# Patient Record
Sex: FEMALE | HISPANIC OR LATINO | URBAN - METROPOLITAN AREA
[De-identification: names, ages, dates, MRNs, and addresses within clinical notes are randomized per-mention and may not be internally consistent; named-entity substitution may affect disease eponyms.]

---

## 2020-09-01 ENCOUNTER — OFFICE VISIT (OUTPATIENT)
Dept: URBAN - METROPOLITAN AREA CLINIC 98 | Facility: CLINIC | Age: 45
End: 2020-09-01
Payer: COMMERCIAL

## 2020-09-01 VITALS
TEMPERATURE: 98.3 F | HEIGHT: 60 IN | HEART RATE: 76 BPM | SYSTOLIC BLOOD PRESSURE: 140 MMHG | WEIGHT: 264 LBS | BODY MASS INDEX: 51.83 KG/M2 | DIASTOLIC BLOOD PRESSURE: 86 MMHG

## 2020-09-01 DIAGNOSIS — R10.84 GENERALIZED ABDOMINAL PAIN: ICD-10-CM

## 2020-09-01 DIAGNOSIS — D50.0 IRON DEFICIENCY ANEMIA DUE TO CHRONIC BLOOD LOSS: ICD-10-CM

## 2020-09-01 DIAGNOSIS — K21.0 REFLUX ESOPHAGITIS: ICD-10-CM

## 2020-09-01 PROCEDURE — G8417 CALC BMI ABV UP PARAM F/U: HCPCS | Performed by: INTERNAL MEDICINE

## 2020-09-01 PROCEDURE — 99204 OFFICE O/P NEW MOD 45 MIN: CPT | Performed by: INTERNAL MEDICINE

## 2020-09-01 PROCEDURE — 1036F TOBACCO NON-USER: CPT | Performed by: INTERNAL MEDICINE

## 2020-09-01 RX ORDER — POLYETHYLENE GLYCOL 3350, SODIUM CHLORIDE, SODIUM BICARBONATE AND POTASSIUM CHLORIDE 420G
AS DIRECTED KIT ORAL ONCE
Qty: 420 GRAM | Refills: 0 | OUTPATIENT
Start: 2020-09-01

## 2020-09-01 RX ORDER — PANTOPRAZOLE SODIUM 40 MG/1
1 TABLET TABLET, DELAYED RELEASE ORAL BID
Qty: 180 TABLET | Refills: 4 | OUTPATIENT
Start: 2020-09-01

## 2020-09-01 NOTE — HPI-TODAY'S VISIT:
Issues with gastritis and bloating for the last year.  Worse over the last couple of months.  Does get reflux as well. Does get regurgitation as well.  Using PPI once a day 40 mg. Been on it for a year- some help.  No CIBH or bleeding.  hgb 9.7 and MCV 70 History of breast cancer- May 2020- low ferritin. Elevated AST And ALT 65, 141- now 31 and 29 US with fatty liver.

## 2020-09-09 ENCOUNTER — OFFICE VISIT (OUTPATIENT)
Dept: URBAN - METROPOLITAN AREA MEDICAL CENTER 28 | Facility: MEDICAL CENTER | Age: 45
End: 2020-09-09
Payer: COMMERCIAL

## 2020-09-09 DIAGNOSIS — D50.9 ANEMIA, IRON DEFICIENCY: ICD-10-CM

## 2020-09-09 DIAGNOSIS — K31.89 ACQUIRED DEFORMITY OF DUODENUM: ICD-10-CM

## 2020-09-09 DIAGNOSIS — K20.8 CORROSIVE ESOPHAGITIS: ICD-10-CM

## 2020-09-09 PROCEDURE — G9937 DIG OR SURV COLSCO: HCPCS | Performed by: INTERNAL MEDICINE

## 2020-09-09 PROCEDURE — 43239 EGD BIOPSY SINGLE/MULTIPLE: CPT | Performed by: INTERNAL MEDICINE

## 2020-09-09 PROCEDURE — 45378 DIAGNOSTIC COLONOSCOPY: CPT | Performed by: INTERNAL MEDICINE

## 2020-09-09 RX ORDER — PANTOPRAZOLE SODIUM 40 MG/1
1 TABLET TABLET, DELAYED RELEASE ORAL BID
Qty: 180 TABLET | Refills: 4 | Status: ACTIVE | COMMUNITY
Start: 2020-09-01

## 2020-09-09 RX ORDER — POLYETHYLENE GLYCOL 3350, SODIUM CHLORIDE, SODIUM BICARBONATE AND POTASSIUM CHLORIDE 420G
AS DIRECTED KIT ORAL ONCE
Qty: 420 GRAM | Refills: 0 | Status: ACTIVE | COMMUNITY
Start: 2020-09-01

## 2020-09-24 ENCOUNTER — TELEPHONE ENCOUNTER (OUTPATIENT)
Dept: URBAN - METROPOLITAN AREA CLINIC 92 | Facility: CLINIC | Age: 45
End: 2020-09-24

## 2020-09-24 ENCOUNTER — OFFICE VISIT (OUTPATIENT)
Dept: URBAN - METROPOLITAN AREA TELEHEALTH 2 | Facility: TELEHEALTH | Age: 45
End: 2020-09-24
Payer: COMMERCIAL

## 2020-09-24 DIAGNOSIS — K21.0 REFLUX ESOPHAGITIS: ICD-10-CM

## 2020-09-24 DIAGNOSIS — R10.84 GENERALIZED ABDOMINAL PAIN: ICD-10-CM

## 2020-09-24 DIAGNOSIS — D50.8 OTHER IRON DEFICIENCY ANEMIAS: ICD-10-CM

## 2020-09-24 PROCEDURE — 1036F TOBACCO NON-USER: CPT | Performed by: INTERNAL MEDICINE

## 2020-09-24 PROCEDURE — G9903 PT SCRN TBCO ID AS NON USER: HCPCS | Performed by: INTERNAL MEDICINE

## 2020-09-24 PROCEDURE — G8417 CALC BMI ABV UP PARAM F/U: HCPCS | Performed by: INTERNAL MEDICINE

## 2020-09-24 PROCEDURE — 99213 OFFICE O/P EST LOW 20 MIN: CPT | Performed by: INTERNAL MEDICINE

## 2020-09-24 RX ORDER — PANTOPRAZOLE SODIUM 40 MG/1
1 TABLET TABLET, DELAYED RELEASE ORAL BID
Qty: 180 TABLET | Refills: 4 | Status: ACTIVE | COMMUNITY
Start: 2020-09-01

## 2020-09-24 RX ORDER — POLYETHYLENE GLYCOL 3350, SODIUM CHLORIDE, SODIUM BICARBONATE AND POTASSIUM CHLORIDE 420G
AS DIRECTED KIT ORAL ONCE
Qty: 420 GRAM | Refills: 0 | Status: ACTIVE | COMMUNITY
Start: 2020-09-01

## 2020-09-24 RX ORDER — AMITRIPTYLINE HYDROCHLORIDE 10 MG/1
TAKE 1 TABLET (10 MG) BY ORAL ROUTE ONCE DAILY AT BEDTIME FOR 30 DAYS TABLET, FILM COATED ORAL 1
Qty: 90 | Refills: 3 | OUTPATIENT
Start: 2020-09-24

## 2020-09-24 RX ORDER — HYDROCORTISONE 25 MG/G
1 APPLICATION CREAM TOPICAL ONCE A DAY
Qty: 1 TUBE | Refills: 1 | OUTPATIENT

## 2020-09-24 NOTE — HPI-TODAY'S VISIT:
Issues with gastritis and bloating for the last year.  Worse over the last couple of months.  Does get reflux as well. Does get regurgitation as well.  Using PPI once a day 40 mg. Been on it for a year- some help.  No CIBH or bleeding.  hgb 9.7 and MCV 70 History of breast cancer- May 2020- low ferritin. Elevated AST And ALT 65, 141- now 31 and 29 US with fatty liver.   Present CT with fatty liver. No other issues noted.  EGD and colon was normal.  LFT improved.  Still has epigastric discomfort.

## 2020-10-16 ENCOUNTER — OFFICE VISIT (OUTPATIENT)
Dept: URBAN - METROPOLITAN AREA CLINIC 98 | Facility: CLINIC | Age: 45
End: 2020-10-16

## 2021-02-23 ENCOUNTER — TELEPHONE ENCOUNTER (OUTPATIENT)
Dept: URBAN - METROPOLITAN AREA CLINIC 98 | Facility: CLINIC | Age: 46
End: 2021-02-23

## 2021-03-03 ENCOUNTER — TELEPHONE ENCOUNTER (OUTPATIENT)
Dept: URBAN - METROPOLITAN AREA CLINIC 98 | Facility: CLINIC | Age: 46
End: 2021-03-03

## 2021-03-18 PROBLEM — 724556004: Status: ACTIVE | Noted: 2021-02-23

## 2021-04-06 ENCOUNTER — OFFICE VISIT (OUTPATIENT)
Dept: URBAN - METROPOLITAN AREA CLINIC 97 | Facility: CLINIC | Age: 46
End: 2021-04-06
Payer: COMMERCIAL

## 2021-04-06 ENCOUNTER — OFFICE VISIT (OUTPATIENT)
Dept: URBAN - METROPOLITAN AREA CLINIC 97 | Facility: CLINIC | Age: 46
End: 2021-04-06

## 2021-04-06 DIAGNOSIS — D50.9 ANEMIA, IRON DEFICIENCY: ICD-10-CM

## 2021-04-06 PROCEDURE — 91110 GI TRC IMG INTRAL ESOPH-ILE: CPT | Performed by: INTERNAL MEDICINE

## 2021-04-07 ENCOUNTER — TELEPHONE ENCOUNTER (OUTPATIENT)
Dept: URBAN - METROPOLITAN AREA CLINIC 98 | Facility: CLINIC | Age: 46
End: 2021-04-07

## 2021-04-07 RX ORDER — PANTOPRAZOLE SODIUM 40 MG/1
1 TABLET TABLET, DELAYED RELEASE ORAL BID
Qty: 180 TABLET | Refills: 4
Start: 2020-09-01

## 2021-11-04 ENCOUNTER — OFFICE VISIT (OUTPATIENT)
Dept: URBAN - METROPOLITAN AREA CLINIC 98 | Facility: CLINIC | Age: 46
End: 2021-11-04
Payer: COMMERCIAL

## 2021-11-04 VITALS
HEART RATE: 71 BPM | DIASTOLIC BLOOD PRESSURE: 74 MMHG | TEMPERATURE: 97.3 F | SYSTOLIC BLOOD PRESSURE: 123 MMHG | BODY MASS INDEX: 49.94 KG/M2 | HEIGHT: 60 IN | WEIGHT: 254.4 LBS

## 2021-11-04 DIAGNOSIS — R10.9 ABDOMINAL DISCOMFORT: ICD-10-CM

## 2021-11-04 DIAGNOSIS — Z90.49 HISTORY OF CHOLECYSTECTOMY: ICD-10-CM

## 2021-11-04 DIAGNOSIS — R94.5 ABNORMAL LFTS: ICD-10-CM

## 2021-11-04 PROBLEM — 428882003: Status: ACTIVE | Noted: 2021-11-04

## 2021-11-04 PROCEDURE — 91200 LIVER ELASTOGRAPHY: CPT | Performed by: INTERNAL MEDICINE

## 2021-11-04 PROCEDURE — 99214 OFFICE O/P EST MOD 30 MIN: CPT | Performed by: INTERNAL MEDICINE

## 2021-11-04 RX ORDER — HYDROCORTISONE 25 MG/G
1 APPLICATION CREAM TOPICAL ONCE A DAY
Qty: 1 TUBE | Refills: 1 | Status: ACTIVE | COMMUNITY

## 2021-11-04 RX ORDER — PANTOPRAZOLE SODIUM 40 MG/1
1 TABLET TABLET, DELAYED RELEASE ORAL BID
Qty: 180 TABLET | Refills: 4 | Status: ACTIVE | COMMUNITY
Start: 2020-09-01

## 2021-11-04 RX ORDER — POLYETHYLENE GLYCOL 3350, SODIUM CHLORIDE, SODIUM BICARBONATE AND POTASSIUM CHLORIDE 420G
AS DIRECTED KIT ORAL ONCE
Qty: 420 GRAM | Refills: 0 | Status: ACTIVE | COMMUNITY
Start: 2020-09-01

## 2021-11-04 RX ORDER — DICYCLOMINE HYDROCHLORIDE 10 MG/1
1 TABLET CAPSULE ORAL THREE TIMES A DAY
Qty: 90 | Refills: 3 | OUTPATIENT
Start: 2021-11-04 | End: 2022-03-03

## 2021-11-04 RX ORDER — AMITRIPTYLINE HYDROCHLORIDE 10 MG/1
TAKE 1 TABLET (10 MG) BY ORAL ROUTE ONCE DAILY AT BEDTIME FOR 30 DAYS TABLET, FILM COATED ORAL 1
Qty: 90 | Refills: 3 | Status: ACTIVE | COMMUNITY
Start: 2020-09-24

## 2021-11-04 NOTE — HPI-TODAY'S VISIT:
Issues with gastritis and bloating for the last year.  Worse over the last couple of months.  Does get reflux as well. Does get regurgitation as well.  Using PPI once a day 40 mg. Been on it for a year- some help.  No CIBH or bleeding.  hgb 9.7 and MCV 70 History of breast cancer- May 2020- low ferritin. Elevated AST And ALT 65, 141- now 31 and 29 US with fatty liver.   Present CT with fatty liver. No other issues noted.  EGD and colon was normal.  Has right sided upper quadrant discomfort.  More consistent for thge last 2 months.  No nasuea or vomiting.  Some relationship to eating.  On pantoprazole 40. Breast cancer- had XRT Had a CCY done with no relief. Done on May 10

## 2021-11-07 LAB
ALBUMIN: 4.1
ALKALINE PHOSPHATASE: 56
ALT (SGPT): 36
AST (SGOT): 52
BILIRUBIN, DIRECT: <0.1
BILIRUBIN, TOTAL: 0.2
C-REACTIVE PROTEIN, QUANT: 5
PROTEIN, TOTAL: 6.6

## 2021-11-16 ENCOUNTER — TELEPHONE ENCOUNTER (OUTPATIENT)
Dept: URBAN - METROPOLITAN AREA CLINIC 98 | Facility: CLINIC | Age: 46
End: 2021-11-16

## 2022-06-08 ENCOUNTER — WEB ENCOUNTER (OUTPATIENT)
Dept: URBAN - METROPOLITAN AREA CLINIC 98 | Facility: CLINIC | Age: 47
End: 2022-06-08

## 2022-06-08 ENCOUNTER — OFFICE VISIT (OUTPATIENT)
Dept: URBAN - METROPOLITAN AREA CLINIC 98 | Facility: CLINIC | Age: 47
End: 2022-06-08
Payer: COMMERCIAL

## 2022-06-08 VITALS
DIASTOLIC BLOOD PRESSURE: 76 MMHG | SYSTOLIC BLOOD PRESSURE: 138 MMHG | HEIGHT: 60 IN | HEART RATE: 65 BPM | BODY MASS INDEX: 52.03 KG/M2 | WEIGHT: 265 LBS | TEMPERATURE: 97.8 F

## 2022-06-08 DIAGNOSIS — K76.0 NAFLD (NONALCOHOLIC FATTY LIVER DISEASE): ICD-10-CM

## 2022-06-08 DIAGNOSIS — E66.01 MORBID OBESITY DUE TO EXCESS CALORIES: ICD-10-CM

## 2022-06-08 PROCEDURE — 99214 OFFICE O/P EST MOD 30 MIN: CPT | Performed by: INTERNAL MEDICINE

## 2022-06-08 RX ORDER — AMITRIPTYLINE HYDROCHLORIDE 10 MG/1
TAKE 1 TABLET (10 MG) BY ORAL ROUTE ONCE DAILY AT BEDTIME FOR 30 DAYS TABLET, FILM COATED ORAL 1
Qty: 90 | Refills: 3 | Status: ACTIVE | COMMUNITY
Start: 2020-09-24

## 2022-06-08 RX ORDER — HYDROCORTISONE 25 MG/G
1 APPLICATION CREAM TOPICAL ONCE A DAY
Qty: 1 TUBE | Refills: 1 | Status: ACTIVE | COMMUNITY

## 2022-06-08 RX ORDER — PANTOPRAZOLE SODIUM 40 MG/1
1 TABLET TABLET, DELAYED RELEASE ORAL BID
Qty: 180 TABLET | Refills: 4 | Status: ACTIVE | COMMUNITY
Start: 2020-09-01

## 2022-06-08 RX ORDER — POLYETHYLENE GLYCOL 3350, SODIUM CHLORIDE, SODIUM BICARBONATE AND POTASSIUM CHLORIDE 420G
AS DIRECTED KIT ORAL ONCE
Qty: 420 GRAM | Refills: 0 | Status: ACTIVE | COMMUNITY
Start: 2020-09-01

## 2022-06-08 NOTE — HPI-TODAY'S VISIT:
46 yopt here for abdominal pain follow up. She has intermittent constipation w passage of hard stools, w pelvic pressure, needing digital evacuation wo melenic stools nor hematochezia. Denies anorexia or weight loss. No constitutional sxs.  She has ha d a previous normal EGD / Colonoscopy w Dr. Hernandez last year. A + P CT 10/21: fatty liver, L-ovarian  cyst and uterine fibroid. Abdominal MRI: 11/21: fatty liver w hepatomegaly, s/p CCY.  She has had intermittent and rather persistent RUQ discomfort w pressure, w radiation to the back, unrelate to eating and wo change in bowel pattern. NO cardiorespiratory sxs. Patient would like to be evaluated for possible Bariatric surgery.

## 2022-06-13 PROBLEM — 197315008: Status: ACTIVE | Noted: 2022-06-13

## 2022-10-17 ENCOUNTER — TELEPHONE ENCOUNTER (OUTPATIENT)
Dept: URBAN - METROPOLITAN AREA CLINIC 98 | Facility: CLINIC | Age: 47
End: 2022-10-17

## 2022-10-20 ENCOUNTER — OFFICE VISIT (OUTPATIENT)
Dept: URBAN - METROPOLITAN AREA CLINIC 6 | Facility: CLINIC | Age: 47
End: 2022-10-20

## 2022-10-20 ENCOUNTER — OFFICE VISIT (OUTPATIENT)
Dept: URBAN - METROPOLITAN AREA CLINIC 98 | Facility: CLINIC | Age: 47
End: 2022-10-20

## 2022-10-20 VITALS
BODY MASS INDEX: 51.04 KG/M2 | TEMPERATURE: 97.8 F | SYSTOLIC BLOOD PRESSURE: 122 MMHG | WEIGHT: 260 LBS | DIASTOLIC BLOOD PRESSURE: 81 MMHG | HEIGHT: 60 IN | HEART RATE: 81 BPM

## 2022-10-20 RX ORDER — PANTOPRAZOLE SODIUM 40 MG/1
1 TABLET TABLET, DELAYED RELEASE ORAL BID
Qty: 180 TABLET | Refills: 4 | Status: ACTIVE | COMMUNITY
Start: 2020-09-01

## 2022-10-20 RX ORDER — AMITRIPTYLINE HYDROCHLORIDE 10 MG/1
TAKE 1 TABLET (10 MG) BY ORAL ROUTE ONCE DAILY AT BEDTIME FOR 30 DAYS TABLET, FILM COATED ORAL 1
Qty: 90 | Refills: 3 | Status: ACTIVE | COMMUNITY
Start: 2020-09-24

## 2022-10-20 RX ORDER — HYDROCORTISONE 25 MG/G
1 APPLICATION CREAM TOPICAL ONCE A DAY
Qty: 1 TUBE | Refills: 1 | Status: ACTIVE | COMMUNITY

## 2022-10-20 RX ORDER — POLYETHYLENE GLYCOL 3350, SODIUM CHLORIDE, SODIUM BICARBONATE AND POTASSIUM CHLORIDE 420G
AS DIRECTED KIT ORAL ONCE
Qty: 420 GRAM | Refills: 0 | Status: ACTIVE | COMMUNITY
Start: 2020-09-01

## 2022-10-20 NOTE — HPI-TODAY'S VISIT:
Patient is a 47-year-old female who presents to follow-up on abdominal pain.  Previously seen by  On end of last seen by Dr. Cheung on 6/8/2022.  Patient had EGD and colonoscopy in 2020 both normal.  CT scan completed which revealed fatty liver and no other issues noted.  Describes right sided upper quadrant discomfort with some relationship to eating.  MRI was completed on 11/4/2021 which revealed hepatomegaly with diffuse moderate to severe hepatic steatosis with no issues hepatic lesions otherwise unremarkable exam.

## 2022-10-28 ENCOUNTER — OFFICE VISIT (OUTPATIENT)
Dept: URBAN - METROPOLITAN AREA CLINIC 98 | Facility: CLINIC | Age: 47
End: 2022-10-28
Payer: COMMERCIAL

## 2022-10-28 VITALS
HEIGHT: 60 IN | DIASTOLIC BLOOD PRESSURE: 68 MMHG | WEIGHT: 266 LBS | HEART RATE: 78 BPM | SYSTOLIC BLOOD PRESSURE: 111 MMHG | BODY MASS INDEX: 52.22 KG/M2 | TEMPERATURE: 97.3 F

## 2022-10-28 DIAGNOSIS — K76.0 FATTY LIVER: ICD-10-CM

## 2022-10-28 DIAGNOSIS — K21.00 CHRONIC REFLUX ESOPHAGITIS: ICD-10-CM

## 2022-10-28 DIAGNOSIS — R10.9 ABDOMINAL DISCOMFORT: ICD-10-CM

## 2022-10-28 DIAGNOSIS — R11.0 NAUSEA: ICD-10-CM

## 2022-10-28 PROBLEM — 266433003: Status: ACTIVE | Noted: 2022-10-28

## 2022-10-28 PROCEDURE — 99214 OFFICE O/P EST MOD 30 MIN: CPT | Performed by: INTERNAL MEDICINE

## 2022-10-28 RX ORDER — PANTOPRAZOLE SODIUM 40 MG/1
1 TABLET TABLET, DELAYED RELEASE ORAL ONCE A DAY
Qty: 90 TABLET | Refills: 4 | OUTPATIENT
Start: 2022-10-28

## 2022-10-28 RX ORDER — PANTOPRAZOLE SODIUM 40 MG/1
1 TABLET TABLET, DELAYED RELEASE ORAL BID
Qty: 180 TABLET | Refills: 4 | Status: ACTIVE | COMMUNITY
Start: 2020-09-01

## 2022-10-28 NOTE — HPI-TODAY'S VISIT:
Patient is a 47-year-old female who presents to follow-up on abdominal pain.  Previously seen by  On end of last seen by Dr. Cheung on 6/8/2022.  Patient had EGD and colonoscopy in 2020 both normal.  CT scan completed which revealed fatty liver and no other issues noted.  Describes right sided upper quadrant discomfort with some relationship to eating.  MRI was completed on 11/4/2021 which revealed hepatomegaly with diffuse moderate to severe hepatic steatosis with no issues hepatic lesions otherwise unremarkable exam.  Present - RUQ discomfort - more with eating - s/p CCY - had EGD/colon/pillcam in 2020 and was normal - has nausea but no vomiting. - gained weight - no CIBH or bleeding. - on PPI 40 in AM

## 2022-10-29 LAB
ALBUMIN: 4.1
ALKALINE PHOSPHATASE: 59
ALT (SGPT): 39
AST (SGOT): 36
BILIRUBIN, DIRECT: 0.11
BILIRUBIN, TOTAL: 0.3
PROTEIN, TOTAL: 6.5

## 2022-11-03 ENCOUNTER — TELEPHONE ENCOUNTER (OUTPATIENT)
Dept: URBAN - METROPOLITAN AREA CLINIC 98 | Facility: CLINIC | Age: 47
End: 2022-11-03

## 2022-11-03 ENCOUNTER — TELEPHONE ENCOUNTER (OUTPATIENT)
Dept: URBAN - METROPOLITAN AREA CLINIC 6 | Facility: CLINIC | Age: 47
End: 2022-11-03

## 2022-11-23 ENCOUNTER — TELEPHONE ENCOUNTER (OUTPATIENT)
Dept: URBAN - METROPOLITAN AREA CLINIC 6 | Facility: CLINIC | Age: 47
End: 2022-11-23

## 2023-02-03 ENCOUNTER — OFFICE VISIT (OUTPATIENT)
Dept: URBAN - METROPOLITAN AREA CLINIC 98 | Facility: CLINIC | Age: 48
End: 2023-02-03

## 2023-02-16 ENCOUNTER — WEB ENCOUNTER (OUTPATIENT)
Dept: URBAN - METROPOLITAN AREA CLINIC 98 | Facility: CLINIC | Age: 48
End: 2023-02-16

## 2023-02-16 ENCOUNTER — OFFICE VISIT (OUTPATIENT)
Dept: URBAN - METROPOLITAN AREA CLINIC 98 | Facility: CLINIC | Age: 48
End: 2023-02-16
Payer: COMMERCIAL

## 2023-02-16 VITALS
HEIGHT: 60 IN | HEART RATE: 89 BPM | TEMPERATURE: 97.7 F | SYSTOLIC BLOOD PRESSURE: 113 MMHG | BODY MASS INDEX: 51.63 KG/M2 | DIASTOLIC BLOOD PRESSURE: 68 MMHG | WEIGHT: 263 LBS

## 2023-02-16 DIAGNOSIS — K76.0 FATTY LIVER: ICD-10-CM

## 2023-02-16 DIAGNOSIS — K21.0 REFLUX ESOPHAGITIS: ICD-10-CM

## 2023-02-16 DIAGNOSIS — R94.5 ABNORMAL LFTS: ICD-10-CM

## 2023-02-16 DIAGNOSIS — R10.11 POSTPRANDIAL ABDOMINAL PAIN IN RIGHT UPPER QUADRANT: ICD-10-CM

## 2023-02-16 DIAGNOSIS — E66.01 MORBID OBESITY DUE TO EXCESS CALORIES: ICD-10-CM

## 2023-02-16 DIAGNOSIS — K64.4 EXTERNAL HEMORRHOIDS: ICD-10-CM

## 2023-02-16 PROBLEM — 197321007: Status: ACTIVE | Noted: 2022-10-28

## 2023-02-16 PROBLEM — 238136002: Status: ACTIVE | Noted: 2022-06-13

## 2023-02-16 PROCEDURE — 99214 OFFICE O/P EST MOD 30 MIN: CPT

## 2023-02-16 RX ORDER — PANTOPRAZOLE SODIUM 40 MG/1
1 TABLET TABLET, DELAYED RELEASE ORAL ONCE A DAY
Qty: 90 TABLET | Refills: 4 | Status: ACTIVE | COMMUNITY
Start: 2022-10-28

## 2023-02-16 RX ORDER — PANTOPRAZOLE SODIUM 40 MG/1
1 TABLET TABLET, DELAYED RELEASE ORAL BID
Qty: 180 TABLET | Refills: 4
Start: 2020-09-01

## 2023-02-16 RX ORDER — DICYCLOMINE HYDROCHLORIDE 20 MG/1
1 TABLET TABLET ORAL
Qty: 90 | Refills: 0 | OUTPATIENT
Start: 2023-02-16 | End: 2023-03-18

## 2023-02-16 RX ORDER — HYDROCORTISONE 25 MG/G
1 APPLICATION CREAM TOPICAL ONCE A DAY
Qty: 1 TUBE | Refills: 1
End: 2023-04-17

## 2023-02-16 NOTE — HPI-TODAY'S VISIT:
Patient is a 47-year-old female who presents to follow-up on abdominal pain.  Previously seen by Dr. Hernandez last seen by Dr. Cheung on 6/8/2022.  Patient had EGD and colonoscopy in 2020 both normal.  CT scan completed which revealed fatty liver and no other issues noted.  Describes right sided upper quadrant discomfort with some relationship to eating.  MRI was completed on 11/4/2021 which revealed hepatomegaly with diffuse moderate to severe hepatic steatosis with no issues hepatic lesions otherwise unremarkable exam Last seen by Dr. Hernandez on 10/2022,  continues to have RUQ discomfort - more with eating. s/p CCY Had EGD/colon/pillcam in 2020 and was normal. Has nausea but no vomiting.  . Today on 2/16/2023, MRI completed 11/22/2022 revealed hepatomegaly and diffuse hepatic steatosis.  Cholecystectomy with no biliary ductal dilation.  No acute process in the abdomen.  Labs completed mild elevation of ALT at 39 and AST of 36.  Total bilirubin at 0.11. Patient endorses she continues to have right sided abdominal pain  Occurs every day with eating. Associated nausea  Only taking pantoprazole once every 3 days- she believed it might exacerbate history of breast cancer per patient  EGD in 2020 overall reassuring with chronic inflammation in the GE junction  Denies vomiting episodes BMs are fluctuating between constipation and diarrhea  Has BM every day- straining with BMs and small volume  Seeing BRBPR intermittently  Colonoscopy from 2020 revealed internal and external hemorrhoids  Not taking anything for BMs- has tried tried stool softeners and it does help  Denies unintentional weight loss

## 2023-03-16 ENCOUNTER — OFFICE VISIT (OUTPATIENT)
Dept: URBAN - METROPOLITAN AREA CLINIC 98 | Facility: CLINIC | Age: 48
End: 2023-03-16

## 2023-03-27 ENCOUNTER — OFFICE VISIT (OUTPATIENT)
Dept: URBAN - METROPOLITAN AREA CLINIC 98 | Facility: CLINIC | Age: 48
End: 2023-03-27
Payer: COMMERCIAL

## 2023-03-27 VITALS
SYSTOLIC BLOOD PRESSURE: 105 MMHG | WEIGHT: 263.2 LBS | HEART RATE: 82 BPM | HEIGHT: 60 IN | BODY MASS INDEX: 51.68 KG/M2 | DIASTOLIC BLOOD PRESSURE: 80 MMHG | TEMPERATURE: 97.1 F

## 2023-03-27 DIAGNOSIS — R10.13 EPIGASTRIC ABDOMINAL PAIN: ICD-10-CM

## 2023-03-27 DIAGNOSIS — K92.1 MELENA: ICD-10-CM

## 2023-03-27 DIAGNOSIS — R11.2 NAUSEA AND VOMITING, UNSPECIFIED VOMITING TYPE: ICD-10-CM

## 2023-03-27 PROCEDURE — 99212 OFFICE O/P EST SF 10 MIN: CPT

## 2023-03-27 RX ORDER — PANTOPRAZOLE SODIUM 40 MG/1
1 TABLET TABLET, DELAYED RELEASE ORAL ONCE A DAY
Qty: 90 TABLET | Refills: 4 | Status: ACTIVE | COMMUNITY
Start: 2022-10-28

## 2023-03-27 RX ORDER — PANTOPRAZOLE SODIUM 40 MG/1
1 TABLET TABLET, DELAYED RELEASE ORAL BID
Qty: 180 TABLET | Refills: 4 | Status: ACTIVE | COMMUNITY
Start: 2020-09-01

## 2023-03-27 RX ORDER — HYDROCORTISONE 25 MG/G
1 APPLICATION CREAM TOPICAL ONCE A DAY
Qty: 1 TUBE | Refills: 1 | Status: ACTIVE | COMMUNITY
End: 2023-04-17

## 2023-03-27 NOTE — HPI-TODAY'S VISIT:
Patient is a 47-year-old female who presents to follow-up on abdominal pain.  Previously seen by Dr. Hernandez last seen by Dr. Cheung on 6/8/2022.  Patient had EGD and colonoscopy in 2020 both normal.  CT scan completed which revealed fatty liver and no other issues noted.  Describes right sided upper quadrant discomfort with some relationship to eating.  MRI was completed on 11/4/2021 which revealed hepatomegaly with diffuse moderate to severe hepatic steatosis with no issues hepatic lesions otherwise unremarkable exam Last seen by Dr. Hernandez on 10/2022,  continues to have RUQ discomfort - more with eating. s/p CCY Had EGD/colon/pillcam in 2020 and was normal. Has nausea but no vomiting.  . Previously on 2/16/2023, MRI completed 11/22/2022 revealed hepatomegaly and diffuse hepatic steatosis.  Cholecystectomy with no biliary ductal dilation.  No acute process in the abdomen.  Labs completed mild elevation of ALT at 39 and AST of 36.  Total bilirubin at 0.11. Patient endorses she continues to have right sided abdominal pain  Occurs every day with eating. Associated nausea  Only taking pantoprazole once every 3 days- she believed it might exacerbate history of breast cancer per patient  EGD in 2020 overall reassuring with chronic inflammation in the GE junction  Denies vomiting episodes BMs are fluctuating between constipation and diarrhea  Has BM every day- straining with BMs and small volume  Seeing [facility] intermittently  Colonoscopy from 2020 revealed internal and external hemorrhoids  Not taking anything for BMs- has tried tried stool softeners and it does help  Denies unintentional weight loss . Today on 3/27/2023, she endorses she feels "really bad".  Saturday she vomited with acute onset of epigastric pain. Currently having pain that radiates to back  She is currently taking pantoprazole BID as well as dicyclomine with no improvement  Pain feels different as it is epigastric and radiates to back- this is different than normal RUQ pain  Ranking a pain 9/10  Having black stools over the weekend as well Denies fever or chills

## 2023-07-20 ENCOUNTER — OFFICE VISIT (OUTPATIENT)
Dept: URBAN - METROPOLITAN AREA CLINIC 98 | Facility: CLINIC | Age: 48
End: 2023-07-20
Payer: COMMERCIAL

## 2023-07-20 ENCOUNTER — WEB ENCOUNTER (OUTPATIENT)
Dept: URBAN - METROPOLITAN AREA CLINIC 98 | Facility: CLINIC | Age: 48
End: 2023-07-20

## 2023-07-20 VITALS
SYSTOLIC BLOOD PRESSURE: 134 MMHG | TEMPERATURE: 98.4 F | BODY MASS INDEX: 51.44 KG/M2 | HEART RATE: 80 BPM | HEIGHT: 60 IN | WEIGHT: 262 LBS | DIASTOLIC BLOOD PRESSURE: 87 MMHG

## 2023-07-20 DIAGNOSIS — R94.5 ABNORMAL LFTS: ICD-10-CM

## 2023-07-20 DIAGNOSIS — E66.01 MORBID OBESITY DUE TO EXCESS CALORIES: ICD-10-CM

## 2023-07-20 DIAGNOSIS — K76.0 FATTY LIVER: ICD-10-CM

## 2023-07-20 DIAGNOSIS — K64.4 EXTERNAL HEMORRHOIDS: ICD-10-CM

## 2023-07-20 PROCEDURE — 99214 OFFICE O/P EST MOD 30 MIN: CPT

## 2023-07-20 RX ORDER — HYDROCORTISONE ACETATE 25 MG/1
1 SUPPOSITORY SUPPOSITORY RECTAL ONCE A DAY
Qty: 10 | Refills: 0 | OUTPATIENT
Start: 2023-07-20 | End: 2023-07-30

## 2023-07-20 RX ORDER — PANTOPRAZOLE SODIUM 40 MG/1
1 TABLET TABLET, DELAYED RELEASE ORAL ONCE A DAY
Qty: 90 TABLET | Refills: 4 | Status: ACTIVE | COMMUNITY
Start: 2022-10-28

## 2023-07-20 RX ORDER — PANTOPRAZOLE SODIUM 40 MG/1
1 TABLET TABLET, DELAYED RELEASE ORAL BID
Qty: 180 TABLET | Refills: 4 | Status: ACTIVE | COMMUNITY
Start: 2020-09-01

## 2023-07-20 NOTE — HPI-TODAY'S VISIT:
Patient is a 47-year-old female who presents to follow-up on abdominal pain.  Previously seen by Dr. Hernandez last seen by Dr. Cheung on 6/8/2022.  Patient had EGD and colonoscopy in 2020 both normal.  CT scan completed which revealed fatty liver and no other issues noted.  Describes right sided upper quadrant discomfort with some relationship to eating.  MRI was completed on 11/4/2021 which revealed hepatomegaly with diffuse moderate to severe hepatic steatosis with no issues hepatic lesions otherwise unremarkable exam Last seen by Dr. Hernandez on 10/2022,  continues to have RUQ discomfort - more with eating. s/p CCY Had EGD/colon/pillcam in 2020 and was normal. Has nausea but no vomiting.  . Previously on 2/16/2023, MRI completed 11/22/2022 revealed hepatomegaly and diffuse hepatic steatosis.  Cholecystectomy with no biliary ductal dilation.  No acute process in the abdomen.  Labs completed mild elevation of ALT at 39 and AST of 36.  Total bilirubin at 0.11. Patient endorses she continues to have right sided abdominal pain  Occurs every day with eating. Associated nausea  Only taking pantoprazole once every 3 days- she believed it might exacerbate history of breast cancer per patient  EGD in 2020 overall reassuring with chronic inflammation in the GE junction  Denies vomiting episodes BMs are fluctuating between constipation and diarrhea  Has BM every day- straining with BMs and small volume  Seeing [facility] intermittently  Colonoscopy from 2020 revealed internal and external hemorrhoids  Not taking anything for BMs- has tried tried stool softeners and it does help  Denies unintentional weight loss . Previously on 3/27/2023, she endorses she feels "really bad".  Saturday she vomited with acute onset of epigastric pain. Currently having pain that radiates to back  She is currently taking pantoprazole BID as well as dicyclomine with no improvement  Pain feels different as it is epigastric and radiates to back- this is different than normal RUQ pain  Ranking a pain 9/10  Having black stools over the weekend as well Denies fever or chills . Today on 7/20/2023, ER visit in 3/2023 following last visit.  Patient went to the ER on 3/27/2023 for complaints of abdominal pain, pain in the upper thigh radiating to the flank and back with associated symptoms of nausea, emesis, and dark stool.  She endorsed in the ER that intensity is described as severe.  CT scan of the abdomen completed revealed diffuse fatty liver.  No acute inflammatory process identified.  Pancreas is described as mildly atrophic.  Per notes, hemoglobin found to be stable at 13.9 and for that reason she did not require admission to the hospital. Labs WNL- no evidence of anemia  patient endorses it has been more than a month with abnormal stools She feels the  stool come into the rectum  but nothing is coming out  She does have a BM daily- feels not evactuating fully  Straining associated with BMs  Denies BRBPR or melena  Taking fiber but no improvement

## 2023-09-21 ENCOUNTER — OFFICE VISIT (OUTPATIENT)
Dept: URBAN - METROPOLITAN AREA CLINIC 98 | Facility: CLINIC | Age: 48
End: 2023-09-21

## 2023-09-21 RX ORDER — PANTOPRAZOLE SODIUM 40 MG/1
1 TABLET TABLET, DELAYED RELEASE ORAL ONCE A DAY
Qty: 90 TABLET | Refills: 4 | Status: ACTIVE | COMMUNITY
Start: 2022-10-28

## 2024-04-04 ENCOUNTER — OV EP (OUTPATIENT)
Dept: URBAN - METROPOLITAN AREA CLINIC 98 | Facility: CLINIC | Age: 49
End: 2024-04-04
Payer: COMMERCIAL

## 2024-04-04 VITALS
DIASTOLIC BLOOD PRESSURE: 80 MMHG | HEIGHT: 60 IN | BODY MASS INDEX: 50.45 KG/M2 | WEIGHT: 257 LBS | HEART RATE: 64 BPM | SYSTOLIC BLOOD PRESSURE: 118 MMHG | TEMPERATURE: 97.3 F

## 2024-04-04 DIAGNOSIS — E66.01 MORBID OBESITY DUE TO EXCESS CALORIES: ICD-10-CM

## 2024-04-04 DIAGNOSIS — K64.4 EXTERNAL HEMORRHOIDS: ICD-10-CM

## 2024-04-04 DIAGNOSIS — K76.0 FATTY LIVER: ICD-10-CM

## 2024-04-04 DIAGNOSIS — R74.8 ABNORMAL LIVER ENZYMES: ICD-10-CM

## 2024-04-04 PROCEDURE — 99214 OFFICE O/P EST MOD 30 MIN: CPT | Performed by: INTERNAL MEDICINE

## 2024-04-04 RX ORDER — PANTOPRAZOLE SODIUM 40 MG/1
1 TABLET TABLET, DELAYED RELEASE ORAL ONCE A DAY
Qty: 90 TABLET | Refills: 4 | Status: ACTIVE | COMMUNITY
Start: 2022-10-28

## 2024-04-04 RX ORDER — POLYETHYLENE GLYCOL 3350, SODIUM SULFATE ANHYDROUS, SODIUM BICARBONATE, SODIUM CHLORIDE, POTASSIUM CHLORIDE 236; 22.74; 6.74; 5.86; 2.97 G/4L; G/4L; G/4L; G/4L; G/4L
4000 ML POWDER, FOR SOLUTION ORAL ONCE
Qty: 1 KIT | Refills: 0 | OUTPATIENT
Start: 2024-04-04 | End: 2024-04-05

## 2024-04-04 NOTE — HPI-TODAY'S VISIT:
Patient is a 47-year-old female who presents to follow-up on abdominal pain.  Previously seen by Dr. Hernandez last seen by Dr. Cheung on 6/8/2022.  Patient had EGD and colonoscopy in 2020 both normal.  CT scan completed which revealed fatty liver and no other issues noted.  Describes right sided upper quadrant discomfort with some relationship to eating.  MRI was completed on 11/4/2021 which revealed hepatomegaly with diffuse moderate to severe hepatic steatosis with no issues hepatic lesions otherwise unremarkable exam Last seen by Dr. Hernandez on 10/2022,  continues to have RUQ discomfort - more with eating. s/p CCY Had EGD/colon/pillcam in 2020 and was normal. Has nausea but no vomiting.  Present - last EGD and colon in 2020 - having issues with constipation - more issues with hemorrhoids and rectal bleeding - MRI in 10/22- fatty liver. No other issues noted.  - had ultrasound with Gyn and told to see GI - some RUQ discomfort

## 2024-05-09 ENCOUNTER — TELEPHONE ENCOUNTER (OUTPATIENT)
Dept: URBAN - METROPOLITAN AREA CLINIC 98 | Facility: CLINIC | Age: 49
End: 2024-05-09

## 2024-05-09 ENCOUNTER — OFFICE VISIT (OUTPATIENT)
Dept: URBAN - METROPOLITAN AREA MEDICAL CENTER 28 | Facility: MEDICAL CENTER | Age: 49
End: 2024-05-09

## 2024-05-14 ENCOUNTER — DASHBOARD ENCOUNTERS (OUTPATIENT)
Age: 49
End: 2024-05-14

## 2024-05-14 ENCOUNTER — LAB OUTSIDE AN ENCOUNTER (OUTPATIENT)
Dept: URBAN - METROPOLITAN AREA CLINIC 98 | Facility: CLINIC | Age: 49
End: 2024-05-14

## 2024-05-14 ENCOUNTER — OFFICE VISIT (OUTPATIENT)
Dept: URBAN - METROPOLITAN AREA CLINIC 98 | Facility: CLINIC | Age: 49
End: 2024-05-14
Payer: COMMERCIAL

## 2024-05-14 VITALS
DIASTOLIC BLOOD PRESSURE: 69 MMHG | BODY MASS INDEX: 50.61 KG/M2 | WEIGHT: 257.8 LBS | TEMPERATURE: 97.9 F | SYSTOLIC BLOOD PRESSURE: 109 MMHG | HEIGHT: 60 IN | HEART RATE: 73 BPM

## 2024-05-14 DIAGNOSIS — K76.0 FATTY LIVER: ICD-10-CM

## 2024-05-14 DIAGNOSIS — R94.5 ABNORMAL LFTS: ICD-10-CM

## 2024-05-14 DIAGNOSIS — E66.01 MORBID OBESITY DUE TO EXCESS CALORIES: ICD-10-CM

## 2024-05-14 DIAGNOSIS — R10.84 GENERALIZED ABDOMINAL PAIN: ICD-10-CM

## 2024-05-14 DIAGNOSIS — K64.4 EXTERNAL HEMORRHOIDS: ICD-10-CM

## 2024-05-14 PROCEDURE — 99213 OFFICE O/P EST LOW 20 MIN: CPT

## 2024-05-14 RX ORDER — PANTOPRAZOLE SODIUM 40 MG/1
1 TABLET TABLET, DELAYED RELEASE ORAL ONCE A DAY
Qty: 90 TABLET | Refills: 4 | Status: ACTIVE | COMMUNITY
Start: 2022-10-28

## 2024-05-24 ENCOUNTER — OFFICE VISIT (OUTPATIENT)
Dept: URBAN - METROPOLITAN AREA CLINIC 98 | Facility: CLINIC | Age: 49
End: 2024-05-24

## 2024-07-03 ENCOUNTER — TELEPHONE ENCOUNTER (OUTPATIENT)
Dept: URBAN - METROPOLITAN AREA CLINIC 98 | Facility: CLINIC | Age: 49
End: 2024-07-03

## 2024-07-03 ENCOUNTER — OFFICE VISIT (OUTPATIENT)
Dept: URBAN - METROPOLITAN AREA MEDICAL CENTER 28 | Facility: MEDICAL CENTER | Age: 49
End: 2024-07-03
Payer: COMMERCIAL

## 2024-07-03 DIAGNOSIS — R10.13 ABDOMINAL PAIN, EPIGASTRIC: ICD-10-CM

## 2024-07-03 DIAGNOSIS — K63.89 OTHER SPECIFIED DISEASES OF INTESTINE: ICD-10-CM

## 2024-07-03 DIAGNOSIS — K21.00 REFLUX ESOPHAGITIS: ICD-10-CM

## 2024-07-03 DIAGNOSIS — K29.50 CHRONIC GASTRITIS: ICD-10-CM

## 2024-07-03 DIAGNOSIS — K92.1 HEMATOCHEZIA: ICD-10-CM

## 2024-07-03 PROCEDURE — 45385 COLONOSCOPY W/LESION REMOVAL: CPT | Performed by: INTERNAL MEDICINE

## 2024-07-03 PROCEDURE — 43239 EGD BIOPSY SINGLE/MULTIPLE: CPT | Performed by: INTERNAL MEDICINE

## 2024-07-03 RX ORDER — PANTOPRAZOLE SODIUM 40 MG/1
1 TABLET TABLET, DELAYED RELEASE ORAL ONCE A DAY
Qty: 90 TABLET | Refills: 4 | Status: ACTIVE | COMMUNITY
Start: 2022-10-28

## 2024-07-08 ENCOUNTER — TELEPHONE ENCOUNTER (OUTPATIENT)
Dept: URBAN - METROPOLITAN AREA CLINIC 98 | Facility: CLINIC | Age: 49
End: 2024-07-08

## 2024-07-08 PROBLEM — 30811009: Status: ACTIVE | Noted: 2024-07-08

## 2024-07-12 ENCOUNTER — TELEPHONE ENCOUNTER (OUTPATIENT)
Dept: URBAN - METROPOLITAN AREA CLINIC 12 | Facility: CLINIC | Age: 49
End: 2024-07-12

## 2024-08-20 ENCOUNTER — LAB OUTSIDE AN ENCOUNTER (OUTPATIENT)
Dept: URBAN - METROPOLITAN AREA CLINIC 98 | Facility: CLINIC | Age: 49
End: 2024-08-20

## 2024-08-20 ENCOUNTER — OFFICE VISIT (OUTPATIENT)
Dept: URBAN - METROPOLITAN AREA CLINIC 98 | Facility: CLINIC | Age: 49
End: 2024-08-20

## 2024-08-20 VITALS
HEIGHT: 60 IN | SYSTOLIC BLOOD PRESSURE: 146 MMHG | HEART RATE: 63 BPM | TEMPERATURE: 97 F | WEIGHT: 258.8 LBS | BODY MASS INDEX: 50.81 KG/M2 | DIASTOLIC BLOOD PRESSURE: 68 MMHG

## 2024-08-20 PROBLEM — 429087003: Status: ACTIVE | Noted: 2024-08-20

## 2024-08-20 RX ORDER — TAMOXIFEN CITRATE 20 MG/1
TAKE 1 TABLET BY MOUTH ONCE DAILY TABLET, FILM COATED ORAL
Qty: 30 EACH | Refills: 0 | Status: ACTIVE | COMMUNITY

## 2024-08-20 RX ORDER — PANTOPRAZOLE SODIUM 40 MG/1
1 TABLET TABLET, DELAYED RELEASE ORAL ONCE A DAY
Qty: 90 TABLET | Refills: 4 | Status: ACTIVE | COMMUNITY
Start: 2022-10-28

## 2024-08-20 RX ORDER — PANTOPRAZOLE SODIUM 40 MG/1
1 TABLET TABLET, DELAYED RELEASE ORAL TWICE A DAY
Qty: 180 TABLET | Refills: 0 | OUTPATIENT
Start: 2024-08-20

## 2024-08-20 NOTE — HPI-OTHER HISTORIES
5/13/24 CT ABDOMEN AND PELVIS WITHOUT CONTRAST Indication: Right-sided flank tenderness - Fatty liver - No biliary dilatation - Spleen is not enlarged - Pancreas, no mass or ductal dilatation or peripancreatic inflammation - No calculus, mass or obstructive uropathy in kidneys - No bowel obstruction or bowel wall thickening - Appendix normal - No acute findings

## 2024-08-20 NOTE — HPI-TODAY'S VISIT:
Visit 7/20/23- Anisha CHAUHAN Patient is a 47-year-old female who presents to follow-up on abdominal pain.  Previously seen by Dr. Hernandez last seen by Dr. Cheung on 6/8/2022.  Patient had EGD and colonoscopy in 2020 both normal.  CT scan completed which revealed fatty liver and no other issues noted.  Describes right sided upper quadrant discomfort with some relationship to eating.  MRI was completed on 11/4/2021 which revealed hepatomegaly with diffuse moderate to severe hepatic steatosis with no issues hepatic lesions otherwise unremarkable exam Last seen by Dr. Hernandez on 10/2022,  continues to have RUQ discomfort - more with eating. s/p CCY Had EGD/colon/pillcam in 2020 and was normal. Has nausea but no vomiting.  Present 4/4/24- Dr. Hernandez - last EGD and colon in 2020 - having issues with constipation - more issues with hemorrhoids and rectal bleeding - MRI in 10/22- fatty liver. No other issues noted.  - had ultrasound with Gyn and told to see GI - some RUQ discomfort  Visit 5/14/24- Kerry Robledo NP - Here to follow-up constipation and rectal bleeding -  Irina #[id number] video - Was scheduled for colonoscopy/egd on 5/9/24; canceled because of viral illness - Yesterday went to [facility] 5/13 for abdominal pain on left and right side of abdomen - Still having discomfort since discharged from ER - Labs 5/13- normal - BM 1 time per day - CT scan 5/13/24- no acute findings  8/20/24- Kerry Robledo NP - 48 yo female patient here to follow-up after EGD/colonoscopy- abdominal pain, rectal bleeding, and constipation -  Bernadine #[id number]- [place] - Spouse here for OV today - EGD 7/3/24 - esophageal ulcer, lesion 6 mm in GE junction, Mild inflammation in antrum. Bx. GE junction- ulceration with reflux esophagitis. Stomach bx noted chronic inflammation. No h.pylori.  Recommend repeat 3 months to assess for healing - Colonoscopy 7/3/24- Moderate internal hemorrhoids, 5 mm polyp- lymphoid aggregates - Started on pantoprazole 40 mg once per day - Still having severe right side pain

## 2024-08-29 LAB
ALBUMIN: 4.3
ALKALINE PHOSPHATASE: 57
ALT (SGPT): 25
AST (SGOT): 20
BASO (ABSOLUTE): 0
BASOS: 1
BILIRUBIN, TOTAL: 0.4
BUN/CREATININE RATIO: 22
BUN: 15
CALCIUM: 9.2
CARBON DIOXIDE, TOTAL: 22
CHLORIDE: 102
CREATININE: 0.69
EGFR: 105
ELF(TM) SCORE: 8.97
EOS (ABSOLUTE): 0.1
EOS: 2
GLOBULIN, TOTAL: 2.6
GLUCOSE: 98
HEMATOCRIT: 42.2
HEMATOLOGY COMMENTS:: (no result)
HEMOGLOBIN: 14
IMMATURE CELLS: (no result)
IMMATURE GRANS (ABS): 0
IMMATURE GRANULOCYTES: 0
LIPASE: 42
LYMPHS (ABSOLUTE): 2.3
LYMPHS: 38
MCH: 29.4
MCHC: 33.2
MCV: 89
MONOCYTES(ABSOLUTE): 0.3
MONOCYTES: 5
NEUTROPHILS (ABSOLUTE): 3.3
NEUTROPHILS: 54
NRBC: (no result)
PLATELETS: 262
POTASSIUM: 4.1
PROTEIN, TOTAL: 6.9
RBC: 4.76
RDW: 13.2
SODIUM: 141
WBC: 6.1

## 2024-09-03 ENCOUNTER — TELEPHONE ENCOUNTER (OUTPATIENT)
Dept: URBAN - METROPOLITAN AREA CLINIC 94 | Facility: CLINIC | Age: 49
End: 2024-09-03

## 2024-09-16 ENCOUNTER — TELEPHONE ENCOUNTER (OUTPATIENT)
Dept: URBAN - METROPOLITAN AREA CLINIC 98 | Facility: CLINIC | Age: 49
End: 2024-09-16

## 2024-09-16 RX ORDER — PANTOPRAZOLE SODIUM 40 MG/1
1 TABLET TABLET, DELAYED RELEASE ORAL TWICE A DAY
Qty: 180 TABLET | Refills: 3
Start: 2024-08-20

## 2024-10-09 ENCOUNTER — TELEPHONE ENCOUNTER (OUTPATIENT)
Dept: URBAN - METROPOLITAN AREA CLINIC 3 | Facility: CLINIC | Age: 49
End: 2024-10-09

## 2024-10-10 ENCOUNTER — OFFICE VISIT (OUTPATIENT)
Dept: URBAN - METROPOLITAN AREA MEDICAL CENTER 28 | Facility: MEDICAL CENTER | Age: 49
End: 2024-10-10

## 2024-10-10 RX ORDER — PANTOPRAZOLE SODIUM 40 MG/1
1 TABLET TABLET, DELAYED RELEASE ORAL ONCE A DAY
Qty: 90 TABLET | Refills: 4 | Status: ACTIVE | COMMUNITY
Start: 2022-10-28

## 2024-10-10 RX ORDER — TAMOXIFEN CITRATE 20 MG/1
TAKE 1 TABLET BY MOUTH ONCE DAILY TABLET, FILM COATED ORAL
Qty: 30 EACH | Refills: 0 | Status: ACTIVE | COMMUNITY

## 2024-10-10 RX ORDER — PANTOPRAZOLE SODIUM 40 MG/1
1 TABLET TABLET, DELAYED RELEASE ORAL TWICE A DAY
Qty: 180 TABLET | Refills: 3 | Status: ACTIVE | COMMUNITY
Start: 2024-08-20

## 2024-12-18 ENCOUNTER — TELEPHONE ENCOUNTER (OUTPATIENT)
Dept: URBAN - METROPOLITAN AREA CLINIC 98 | Facility: CLINIC | Age: 49
End: 2024-12-18

## 2025-01-31 ENCOUNTER — LAB OUTSIDE AN ENCOUNTER (OUTPATIENT)
Dept: URBAN - METROPOLITAN AREA CLINIC 98 | Facility: CLINIC | Age: 50
End: 2025-01-31

## 2025-01-31 ENCOUNTER — OFFICE VISIT (OUTPATIENT)
Dept: URBAN - METROPOLITAN AREA CLINIC 98 | Facility: CLINIC | Age: 50
End: 2025-01-31

## 2025-01-31 VITALS
TEMPERATURE: 97.2 F | HEIGHT: 60 IN | WEIGHT: 261 LBS | DIASTOLIC BLOOD PRESSURE: 70 MMHG | HEART RATE: 83 BPM | SYSTOLIC BLOOD PRESSURE: 122 MMHG | BODY MASS INDEX: 51.24 KG/M2

## 2025-01-31 RX ORDER — TAMOXIFEN CITRATE 20 MG/1
TAKE 1 TABLET BY MOUTH ONCE DAILY TABLET, FILM COATED ORAL
Qty: 30 EACH | Refills: 0 | Status: ON HOLD | COMMUNITY

## 2025-01-31 RX ORDER — PANTOPRAZOLE SODIUM 40 MG/1
1 TABLET TABLET, DELAYED RELEASE ORAL TWICE A DAY
Qty: 180 TABLET | Refills: 3 | Status: ACTIVE | COMMUNITY
Start: 2024-08-20

## 2025-01-31 RX ORDER — PANTOPRAZOLE SODIUM 40 MG/1
1 TABLET TABLET, DELAYED RELEASE ORAL ONCE A DAY
Qty: 90 TABLET | Refills: 4 | Status: DISCONTINUED | COMMUNITY
Start: 2022-10-28

## 2025-01-31 NOTE — HPI-TODAY'S VISIT:
Had EGD and colon in 7/24 Found to have polyps and hemorrhoids EGD with GE junction ulcer due for repeat endoscopy- bx with esophagitis on PPI once a day No CIBH Told to have fatty liver. MRI with severe steatosis- improved since 2021

## 2025-02-27 ENCOUNTER — TELEPHONE ENCOUNTER (OUTPATIENT)
Dept: URBAN - METROPOLITAN AREA CLINIC 98 | Facility: CLINIC | Age: 50
End: 2025-02-27

## 2025-02-27 RX ORDER — PANTOPRAZOLE SODIUM 40 MG/1
1 TABLET TABLET, DELAYED RELEASE ORAL TWICE A DAY
Qty: 180 TABLET | Refills: 3
Start: 2024-08-20

## 2025-03-13 ENCOUNTER — OFFICE VISIT (OUTPATIENT)
Dept: URBAN - METROPOLITAN AREA MEDICAL CENTER 28 | Facility: MEDICAL CENTER | Age: 50
End: 2025-03-13
Payer: COMMERCIAL

## 2025-03-13 DIAGNOSIS — K20.80 OTHER ESOPHAGITIS: ICD-10-CM

## 2025-03-13 DIAGNOSIS — K29.50 CHRONIC GASTRITIS: ICD-10-CM

## 2025-03-13 PROCEDURE — 43239 EGD BIOPSY SINGLE/MULTIPLE: CPT | Performed by: INTERNAL MEDICINE

## 2025-03-13 RX ORDER — TAMOXIFEN CITRATE 20 MG/1
TAKE 1 TABLET BY MOUTH ONCE DAILY TABLET, FILM COATED ORAL
Qty: 30 EACH | Refills: 0 | Status: ON HOLD | COMMUNITY

## 2025-03-13 RX ORDER — PANTOPRAZOLE SODIUM 40 MG/1
1 TABLET TABLET, DELAYED RELEASE ORAL TWICE A DAY
Qty: 180 TABLET | Refills: 3 | Status: ACTIVE | COMMUNITY
Start: 2024-08-20

## 2025-03-17 ENCOUNTER — TELEPHONE ENCOUNTER (OUTPATIENT)
Dept: URBAN - METROPOLITAN AREA CLINIC 98 | Facility: CLINIC | Age: 50
End: 2025-03-17

## 2025-03-27 ENCOUNTER — OFFICE VISIT (OUTPATIENT)
Dept: URBAN - METROPOLITAN AREA CLINIC 98 | Facility: CLINIC | Age: 50
End: 2025-03-27
Payer: COMMERCIAL

## 2025-03-27 DIAGNOSIS — K76.0 FATTY (CHANGE OF) LIVER, NOT ELSEWHERE CLASSIFIED: ICD-10-CM

## 2025-03-27 DIAGNOSIS — R10.11 RUQ ABDOMINAL PAIN: ICD-10-CM

## 2025-03-27 PROCEDURE — 99214 OFFICE O/P EST MOD 30 MIN: CPT | Performed by: INTERNAL MEDICINE

## 2025-03-27 RX ORDER — TAMOXIFEN CITRATE 20 MG/1
TAKE 1 TABLET BY MOUTH ONCE DAILY TABLET, FILM COATED ORAL
Qty: 30 EACH | Refills: 0 | Status: ON HOLD | COMMUNITY

## 2025-03-27 RX ORDER — PANTOPRAZOLE SODIUM 40 MG/1
1 TABLET TABLET, DELAYED RELEASE ORAL TWICE A DAY
Qty: 180 TABLET | Refills: 3 | Status: ACTIVE | COMMUNITY
Start: 2024-08-20

## 2025-03-27 NOTE — HPI-TODAY'S VISIT:
Pt of Dr Hernandez ; video   she says she has "pain" in liver. when she eats any food, the pain will start in RUQ - last an hour or more. sometimes stabbing pain this has been going on for years since 2019 ; radiation .   MRI 8/2024 moderate (prev severe 2021) steatosis with hepatomegaly  labs 2/2025 alt 18 ast 23 alp 49 tbilil 0.2 alb 4 na 137 hb 10.4 plt 290 . 1 week ago had hemorrhoid surgery  considering to start on semaglutide

## 2025-03-27 NOTE — PHYSICAL EXAM GASTROINTESTINAL
Abdomen,  soft, nontender to even deep palpation, nondistended,midline incision w hernia - easily reduced, abdomen is obese

## 2025-08-27 ENCOUNTER — OFFICE VISIT (OUTPATIENT)
Dept: URBAN - METROPOLITAN AREA CLINIC 98 | Facility: CLINIC | Age: 50
End: 2025-08-27

## 2025-08-27 RX ORDER — TAMOXIFEN CITRATE 20 MG/1
TAKE 1 TABLET BY MOUTH ONCE DAILY TABLET, FILM COATED ORAL
Qty: 30 EACH | Refills: 0 | COMMUNITY

## 2025-08-27 RX ORDER — PANTOPRAZOLE SODIUM 40 MG/1
1 TABLET TABLET, DELAYED RELEASE ORAL TWICE A DAY
Qty: 180 TABLET | Refills: 3 | COMMUNITY
Start: 2024-08-20